# Patient Record
Sex: MALE | Race: ASIAN
[De-identification: names, ages, dates, MRNs, and addresses within clinical notes are randomized per-mention and may not be internally consistent; named-entity substitution may affect disease eponyms.]

---

## 2017-12-06 ENCOUNTER — APPOINTMENT (OUTPATIENT)
Dept: POPULATION HEALTH | Facility: CLINIC | Age: 22
End: 2017-12-06
Payer: COMMERCIAL

## 2017-12-06 PROCEDURE — 90746 HEPB VACCINE 3 DOSE ADULT IM: CPT

## 2017-12-06 PROCEDURE — 99212 OFFICE O/P EST SF 10 MIN: CPT | Mod: 25

## 2017-12-06 PROCEDURE — G0010: CPT

## 2017-12-06 PROCEDURE — 36415 COLL VENOUS BLD VENIPUNCTURE: CPT

## 2017-12-13 ENCOUNTER — APPOINTMENT (OUTPATIENT)
Dept: POPULATION HEALTH | Facility: CLINIC | Age: 22
End: 2017-12-13
Payer: COMMERCIAL

## 2017-12-13 PROCEDURE — 90746 HEPB VACCINE 3 DOSE ADULT IM: CPT

## 2017-12-13 PROCEDURE — G0010: CPT

## 2018-01-18 ENCOUNTER — APPOINTMENT (OUTPATIENT)
Dept: POPULATION HEALTH | Facility: CLINIC | Age: 23
End: 2018-01-18

## 2018-01-22 ENCOUNTER — APPOINTMENT (OUTPATIENT)
Dept: POPULATION HEALTH | Facility: CLINIC | Age: 23
End: 2018-01-22
Payer: COMMERCIAL

## 2018-01-22 PROCEDURE — 90746 HEPB VACCINE 3 DOSE ADULT IM: CPT

## 2018-01-22 PROCEDURE — G0010: CPT

## 2018-06-26 ENCOUNTER — APPOINTMENT (OUTPATIENT)
Dept: POPULATION HEALTH | Facility: CLINIC | Age: 23
End: 2018-06-26
Payer: COMMERCIAL

## 2018-06-26 PROCEDURE — G0010: CPT

## 2018-06-26 PROCEDURE — 90746 HEPB VACCINE 3 DOSE ADULT IM: CPT

## 2019-02-10 ENCOUNTER — EMERGENCY (EMERGENCY)
Facility: HOSPITAL | Age: 24
LOS: 0 days | Discharge: HOME | End: 2019-02-10
Attending: EMERGENCY MEDICINE
Payer: COMMERCIAL

## 2019-02-10 VITALS
RESPIRATION RATE: 18 BRPM | TEMPERATURE: 98 F | OXYGEN SATURATION: 99 % | HEIGHT: 69 IN | WEIGHT: 199.96 LBS | SYSTOLIC BLOOD PRESSURE: 156 MMHG | HEART RATE: 92 BPM | DIASTOLIC BLOOD PRESSURE: 96 MMHG

## 2019-02-10 DIAGNOSIS — M10.9 GOUT, UNSPECIFIED: ICD-10-CM

## 2019-02-10 DIAGNOSIS — M25.571 PAIN IN RIGHT ANKLE AND JOINTS OF RIGHT FOOT: ICD-10-CM

## 2019-02-10 PROCEDURE — 73610 X-RAY EXAM OF ANKLE: CPT | Mod: 26,RT

## 2019-02-10 PROCEDURE — 99283 EMERGENCY DEPT VISIT LOW MDM: CPT | Mod: 25

## 2019-02-10 RX ORDER — DEXTROAMPHETAMINE SACCHARATE, AMPHETAMINE ASPARTATE, DEXTROAMPHETAMINE SULFATE AND AMPHETAMINE SULFATE 1.875; 1.875; 1.875; 1.875 MG/1; MG/1; MG/1; MG/1
1 TABLET ORAL
Qty: 0 | Refills: 0 | COMMUNITY

## 2019-02-10 RX ORDER — OXYCODONE AND ACETAMINOPHEN 5; 325 MG/1; MG/1
1 TABLET ORAL ONCE
Qty: 0 | Refills: 0 | Status: DISCONTINUED | OUTPATIENT
Start: 2019-02-10 | End: 2019-02-10

## 2019-02-10 RX ORDER — IBUPROFEN 200 MG
600 TABLET ORAL ONCE
Qty: 0 | Refills: 0 | Status: COMPLETED | OUTPATIENT
Start: 2019-02-10 | End: 2019-02-10

## 2019-02-10 RX ORDER — ACETAMINOPHEN 500 MG
975 TABLET ORAL ONCE
Qty: 0 | Refills: 0 | Status: DISCONTINUED | OUTPATIENT
Start: 2019-02-10 | End: 2019-02-10

## 2019-02-10 RX ADMIN — OXYCODONE AND ACETAMINOPHEN 1 TABLET(S): 5; 325 TABLET ORAL at 09:33

## 2019-02-10 RX ADMIN — Medication 600 MILLIGRAM(S): at 09:33

## 2019-02-10 NOTE — ED PROVIDER NOTE - PHYSICAL EXAMINATION
Vitals: HTN at 156/96, otherwise WNL  Gen: AAOx3, NAD, sitting uncomfortably in stretcher  Head: ncat, perrla, eomi b/l  Neck: supple, no lymphadenopathy, no midline deviation  Heart: rrr, no m/r/g  Lungs: CTA b/l, no rales/ronchi/wheezes  Abd: soft, nontender, non-distended, no rebound or guarding  Ext: no clubbing/cyanosis/edema, mild localized swelling of R ankle and achilles area, tender to palpation over achilles tendon at insertion  Neuro: sensation and muscle strength intact b/l, steady gait Vitals: HTN at 156/96, otherwise WNL  Gen: AAOx3, NAD, sitting uncomfortably in stretcher  Head: ncat, perrla, eomi b/l  Neck: supple, no lymphadenopathy, no midline deviation  Heart: rrr, no m/r/g  Lungs: CTA b/l, no rales/ronchi/wheezes  Abd: soft, nontender, non-distended, no rebound or guarding  Ext: no clubbing/cyanosis/edema, mild localized swelling of R ankle and achilles area, tender to palpation over achilles tendon at insertion, gilliland test negative  Neuro: sensation and muscle strength intact b/l, steady gait

## 2019-02-10 NOTE — ED PROVIDER NOTE - PROGRESS NOTE DETAILS
Results reported to patient--grossly benign, XR shows no acute fracture  Pt. reports feeling better after meds  pt. agrees to f/u with primary care outpt., will refer to ortho; likely achilles strain  pt. understands to return to ED if symptoms worsen; will d/c

## 2019-02-10 NOTE — ED ADULT NURSE NOTE - OBJECTIVE STATEMENT
Pt was at work and noticed he had ankle pain on the right side, pt states that it was intermittent at first, but then he went to work and noticed it became more constant. Pt is not in any distress, but states the ankle pain is more consistent now. Pt has been icing it and keeping elevated.

## 2019-02-10 NOTE — ED PROVIDER NOTE - OBJECTIVE STATEMENT
22 yo M with R ankle pain, posterior at achilles tendon.  pt. 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  He denies acute inciting event.  notes that new shoes he was wearing are angled strangely and might have affected his gait.  No other complaints or inciting event.  Pt. feels well otherwise.   ROS: negative for fever, cough, headache, chest pain, shortness of breath, abd pain, nausea, vomiting, diarrhea, rash, paresthesia, and weakness--all other systems reviewed are negative.   PMH: asthma, gout; Meds: See EMR; SH: Denies smoking/drinking/drug use

## 2019-02-10 NOTE — ED PROVIDER NOTE - CARE PROVIDER_API CALL
Chi Allred (DO)  Orthopaedic Surgery  [address] [place] [address]  Phone: [phone]  Fax: [phone]  Follow Up Time: 4-6 Days

## 2019-02-10 NOTE — ED ADULT NURSE REASSESSMENT NOTE - NS ED NURSE REASSESS COMMENT FT1
Pt educated about follow up care and discharge instructions, pt ont in any distress or discomfort. Pt educated about crutch teaching, demonstration of crutch walking proves pt is proficient in ambulation. Ptis in some discomfort, no disitress noted at this time. Pt able to ambulate safely and steadily w/out assistance, denies dizziness/weakness upon standing,pt d/c'd home w/ family. Pt education deemed successful at time of discharge after patient education and teach back proves proficiency. Pt has no distress at time of discharge, pt provided discharge instructions, follow up care and results reviewed by MD. Reinforced by the RN at time of discharge.

## 2019-04-12 ENCOUNTER — APPOINTMENT (OUTPATIENT)
Dept: RHEUMATOLOGY | Facility: CLINIC | Age: 24
End: 2019-04-12

## 2019-06-30 ENCOUNTER — EMERGENCY (EMERGENCY)
Facility: HOSPITAL | Age: 24
LOS: 1 days | Discharge: HOME | End: 2019-06-30
Attending: EMERGENCY MEDICINE | Admitting: EMERGENCY MEDICINE
Payer: COMMERCIAL

## 2019-06-30 VITALS
RESPIRATION RATE: 18 BRPM | TEMPERATURE: 98 F | SYSTOLIC BLOOD PRESSURE: 138 MMHG | OXYGEN SATURATION: 100 % | DIASTOLIC BLOOD PRESSURE: 91 MMHG | HEART RATE: 61 BPM

## 2019-06-30 VITALS
DIASTOLIC BLOOD PRESSURE: 100 MMHG | HEART RATE: 59 BPM | TEMPERATURE: 99 F | OXYGEN SATURATION: 99 % | SYSTOLIC BLOOD PRESSURE: 149 MMHG | RESPIRATION RATE: 16 BRPM

## 2019-06-30 DIAGNOSIS — F33.1 MAJOR DEPRESSIVE DISORDER, RECURRENT, MODERATE: ICD-10-CM

## 2019-06-30 PROCEDURE — 99053 MED SERV 10PM-8AM 24 HR FAC: CPT

## 2019-06-30 PROCEDURE — 90792 PSYCH DIAG EVAL W/MED SRVCS: CPT

## 2019-06-30 PROCEDURE — 99283 EMERGENCY DEPT VISIT LOW MDM: CPT

## 2019-06-30 NOTE — ED BEHAVIORAL HEALTH ASSESSMENT NOTE - RISK ASSESSMENT
risk factors: mood episode recent thoughts of  hurting himself; substance abuse  protective factors: future oriented, no active or passive SI at present time No HX of SA, employed, cares about his cats; good family support In treatment, compliant

## 2019-06-30 NOTE — ED PROVIDER NOTE - OBJECTIVE STATEMENT
24 y/o m with pmhx of anxiety, ptsd, depression p/w worsening depression.  was found out gf of 6 years was cheating on him .states feels has no hope to live. family states pt sent note to friends and states he will kill himself on july 19th on the anniversary of his relationship. no hx of suicide attempt in the past. no drug use, no etoh. pt states took time off school for work but otherwise has no focal complaints. did not  take any meds prior to ed eval .

## 2019-06-30 NOTE — ED ADULT TRIAGE NOTE - NS ED NURSE BANDS TYPE
Name band; Notification Instructions: Patient will be notified of biopsy results. However, patient instructed to call the office if not contacted within 2 weeks.

## 2019-06-30 NOTE — ED BEHAVIORAL HEALTH ASSESSMENT NOTE - SUMMARY
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    Patient reports that he has been feeling very sad since yesterday after he found out that his girlfriend has been cheating on him for " a while"; my friend told me yesterday and I confronted her, she admitted to cheating on me" "I don't know how I can trust anyone; I was devastated" He reports that he has been feeling depressed, could not sleep and was feeling helpless;  he also admits that he had a " thought of ending it" but "it passed by;" no plan or intend "never" as per the patient . Patient denies any suicidality at present time He states that he has a lot to live for; he has 2 cats whom he adores and he loves his family and he will never do anything to hurt them; he also states that he likes his job and thinking of going back to college. He likes writing music.  Patient states that he is hoping to get back together again because it happened in the past. He states that he wanted to go to the beach after he found out about the cheating to rest and distract himself but his mother asked him to come to ER He denies hearing voices, no visions or delusions He denies any manic symptoms No goal directed activity, no diminished need for sleep HE has been compliant with meds and appointments.   Collateral information was obtained from patient's mother who reported that 2 days ago she received a call from his son's girlfriend who stated that he said that he was going to kill himself Mother drove to her place and got her son "in couple of hours" AS per the girlfriend he told her that he is going to kill himself on July 19th (on their 6th anniversary   patient's mother called his psychiatrist  and he advices them to bring to ER . patient's mother reports that she is concerned about him but she does not think that he is suicidal at present time. She states that patient has "rough" relationship with his girlfriend and "this is nothing new" she "did not hear that he is suicidal from him" She states that he still wants to go on vacation in a week with his friends and that he is on vacation now because he wants to get ready.   Patient reports that he wants to go [place] with his friends although he feels somewhat humiliated. He sates that they are a good support for him. He is planning to see his therapist "2 times this week" And then he will see his psychiatrist before he goes to [place]  Patient does not want to be hospitalized, mother states that she will stay with him for today and will drive him to therapy ton Monday She will also schedule an appointment with his psych Dr Cano

## 2019-06-30 NOTE — ED PROVIDER NOTE - CLINICAL SUMMARY MEDICAL DECISION MAKING FREE TEXT BOX
pt p/w acute onchronic depression with worsening depression. hd stable, pt pending psych eval for dispo as per psych .

## 2019-06-30 NOTE — ED PROVIDER NOTE - PROGRESS NOTE DETAILS
sonja: psych cleared pt for d/c home. will f/u with psych outpt this upcoming week. pt agrees. family agrees.

## 2019-06-30 NOTE — ED BEHAVIORAL HEALTH ASSESSMENT NOTE - OTHER
mother called the psychiatrist who recommended to bring patient to ER has cats; cares about them, and states that he will never hurt his family by having SA tearful,- constricted tearful
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
## 2019-06-30 NOTE — ED ADULT NURSE NOTE - OBJECTIVE STATEMENT
Pt received to [facility]. Pt presents withdrawn, poor eye contact, with delayed responses. Pt states "someone told him some things" which prompted him to come to the ER. Per EMS, pt cited a recent break up with girlfriend and also per EMS pt was sending suicidal text messages to friends which pt denies. Pt denies SI/HI. Pts belongings secured for safety. Pt awaiting psychiatric evaluation.

## 2019-06-30 NOTE — ED PROVIDER NOTE - PSYCHIATRIC, MLM
Alert and oriented to person, place, time/situation. depressed mood and affect. +apparent risk to self. no risk to others others.

## 2019-06-30 NOTE — ED BEHAVIORAL HEALTH ASSESSMENT NOTE - SUICIDE PROTECTIVE FACTORS
Responsibility to family and others/Identifies reasons for living/Future oriented/High spirituality/Engaged in work or school/Positive therapeutic relationships/High frustration tolerance/Other
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
## 2019-06-30 NOTE — ED PROVIDER NOTE - PHYSICAL EXAMINATION
pt whispering not fully answering questions.   family used( due to risk to patient) for collatoral information.

## 2019-06-30 NOTE — ED BEHAVIORAL HEALTH ASSESSMENT NOTE - DESCRIPTION
cooperative; pleasant   Vital Signs Last 24 Hrs  T(C): 36.7 (30 Jun 2019 07:32), Max: 37.2 (30 Jun 2019 02:20)  T(F): 98.1 (30 Jun 2019 07:32), Max: 98.9 (30 Jun 2019 02:20)  HR: 61 (30 Jun 2019 07:32) (59 - 61)  BP: 138/91 (30 Jun 2019 07:32) (138/91 - 149/100)  BP(mean): --  RR: 18 (30 Jun 2019 07:32) (16 - 18)  SpO2: 100% (30 Jun 2019 07:32) (99% - 100%) denies sony in relationship for 6 years off and on, childless  domiciled, resides with his parents
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
## 2022-04-27 VITALS — BODY MASS INDEX: 32.58 KG/M2 | WEIGHT: 220 LBS | HEIGHT: 69 IN

## 2022-04-28 ENCOUNTER — APPOINTMENT (OUTPATIENT)
Dept: ORTHOPEDIC SURGERY | Facility: CLINIC | Age: 27
End: 2022-04-28

## 2023-04-28 NOTE — ED BEHAVIORAL HEALTH ASSESSMENT NOTE - HPI (INCLUDE ILLNESS QUALITY, SEVERITY, DURATION, TIMING, CONTEXT, MODIFYING FACTORS, ASSOCIATED SIGNS AND SYMPTOMS)
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    Patient reports that he has been feeling very sad since yesterday after he found out that his girlfriend has been cheating on him for " a while"; my friend told me yesterday and I confronted her, she admitted to cheating on me" "I don't know how I can trust anyone; I was devastated" He reports that he has been feeling depressed, could not sleep and was feeling helpless;  he also admits that he had a " thought of ending it" but "it passed by;" no plan or intend "never" as per the patient . Patient denies any suicidality at present time He states that he has a lot to live for; he has 2 cats whom he adores and he loves his family and he will never do anything to hurt them; he also states that he likes his job and thinking of going back to college. He likes writing music.  Patient states that he is hoping to get back together again because it happened in the past. He states that he wanted to go to the beach after he found out about the cheating to rest and distract himself but his mother asked him to come to ER He denies hearing voices, no visions or delusions He denies any manic symptoms No goal directed activity, no diminished need for sleep HE has been compliant with meds and appointments.   Collateral information was obtained from patient's mother who reported that 2 days ago she received a call from his son's girlfriend who stated that he said that he was going to kill himself Mother drove to her place and got her son "in couple of hours" AS per the girlfriend he told her that he is going to kill himself on July 19th (on their 6th anniversary   patient's mother called his psychiatrist  and he advices them to bring to ER . patient's mother reports that she is concerned about him but she does not think that he is suicidal at present time. She states that patient has "rough" relationship with his girlfriend and "this is nothing new" she "did not hear that he is suicidal from him" She states that he still wants to go on vacation in a week with his friends and that he is on vacation now because he wants to get ready.   Patient reports that he wants to go [place] with his friends although he feels somewhat humiliated. He sates that they are a good support for him. He is planning to see his therapist "2 times this week" And then he will see his psychiatrist before he goes to [place] Yes

## 2023-10-10 ENCOUNTER — EMERGENCY (EMERGENCY)
Facility: HOSPITAL | Age: 28
LOS: 1 days | Discharge: HOME | End: 2023-10-10
Attending: STUDENT IN AN ORGANIZED HEALTH CARE EDUCATION/TRAINING PROGRAM
Payer: SELF-PAY

## 2023-10-10 VITALS
TEMPERATURE: 98 F | RESPIRATION RATE: 20 BRPM | WEIGHT: 198.42 LBS | SYSTOLIC BLOOD PRESSURE: 153 MMHG | DIASTOLIC BLOOD PRESSURE: 87 MMHG | HEART RATE: 91 BPM | OXYGEN SATURATION: 97 %

## 2023-10-10 LAB
ALBUMIN SERPL ELPH-MCNC: 3.9 G/DL — SIGNIFICANT CHANGE UP (ref 3.5–5)
ALP SERPL-CCNC: 76 U/L — SIGNIFICANT CHANGE UP (ref 40–120)
ALT FLD-CCNC: 56 U/L DA — SIGNIFICANT CHANGE UP (ref 10–60)
AMPHET UR-MCNC: NEGATIVE — SIGNIFICANT CHANGE UP
ANION GAP SERPL CALC-SCNC: 7 MMOL/L — SIGNIFICANT CHANGE UP (ref 5–17)
APAP SERPL-MCNC: <2 UG/ML — LOW (ref 10–30)
APPEARANCE UR: CLEAR — SIGNIFICANT CHANGE UP
AST SERPL-CCNC: 26 U/L — SIGNIFICANT CHANGE UP (ref 10–40)
BACTERIA # UR AUTO: ABNORMAL /HPF
BACTERIA # UR AUTO: ABNORMAL /HPF
BARBITURATES UR SCN-MCNC: NEGATIVE — SIGNIFICANT CHANGE UP
BASOPHILS # BLD AUTO: 0.1 K/UL — SIGNIFICANT CHANGE UP (ref 0–0.2)
BASOPHILS NFR BLD AUTO: 1 % — SIGNIFICANT CHANGE UP (ref 0–2)
BENZODIAZ UR-MCNC: NEGATIVE — SIGNIFICANT CHANGE UP
BILIRUB SERPL-MCNC: 0.2 MG/DL — SIGNIFICANT CHANGE UP (ref 0.2–1.2)
BILIRUB UR-MCNC: NEGATIVE — SIGNIFICANT CHANGE UP
BUN SERPL-MCNC: 17 MG/DL — SIGNIFICANT CHANGE UP (ref 7–18)
CALCIUM SERPL-MCNC: 8.5 MG/DL — SIGNIFICANT CHANGE UP (ref 8.4–10.5)
CHLORIDE SERPL-SCNC: 103 MMOL/L — SIGNIFICANT CHANGE UP (ref 96–108)
CO2 SERPL-SCNC: 27 MMOL/L — SIGNIFICANT CHANGE UP (ref 22–31)
COCAINE METAB.OTHER UR-MCNC: NEGATIVE — SIGNIFICANT CHANGE UP
COLOR SPEC: YELLOW — SIGNIFICANT CHANGE UP
CREAT SERPL-MCNC: 1.06 MG/DL — SIGNIFICANT CHANGE UP (ref 0.5–1.3)
DIFF PNL FLD: NEGATIVE — SIGNIFICANT CHANGE UP
EGFR: 99 ML/MIN/1.73M2 — SIGNIFICANT CHANGE UP
EOSINOPHIL # BLD AUTO: 0.3 K/UL — SIGNIFICANT CHANGE UP (ref 0–0.5)
EOSINOPHIL NFR BLD AUTO: 3.1 % — SIGNIFICANT CHANGE UP (ref 0–6)
ETHANOL SERPL-MCNC: 4 MG/DL — SIGNIFICANT CHANGE UP (ref 0–10)
GLUCOSE SERPL-MCNC: 94 MG/DL — SIGNIFICANT CHANGE UP (ref 70–99)
GLUCOSE UR QL: NEGATIVE MG/DL — SIGNIFICANT CHANGE UP
HCT VFR BLD CALC: 40.1 % — SIGNIFICANT CHANGE UP (ref 39–50)
HGB BLD-MCNC: 13.2 G/DL — SIGNIFICANT CHANGE UP (ref 13–17)
IMM GRANULOCYTES NFR BLD AUTO: 0.8 % — SIGNIFICANT CHANGE UP (ref 0–0.9)
KETONES UR-MCNC: NEGATIVE MG/DL — SIGNIFICANT CHANGE UP
LEUKOCYTE ESTERASE UR-ACNC: NEGATIVE — SIGNIFICANT CHANGE UP
LYMPHOCYTES # BLD AUTO: 3.32 K/UL — HIGH (ref 1–3.3)
LYMPHOCYTES # BLD AUTO: 33.9 % — SIGNIFICANT CHANGE UP (ref 13–44)
MCHC RBC-ENTMCNC: 28.9 PG — SIGNIFICANT CHANGE UP (ref 27–34)
MCHC RBC-ENTMCNC: 32.9 GM/DL — SIGNIFICANT CHANGE UP (ref 32–36)
MCV RBC AUTO: 87.7 FL — SIGNIFICANT CHANGE UP (ref 80–100)
METHADONE UR-MCNC: NEGATIVE — SIGNIFICANT CHANGE UP
MONOCYTES # BLD AUTO: 1.02 K/UL — HIGH (ref 0–0.9)
MONOCYTES NFR BLD AUTO: 10.4 % — SIGNIFICANT CHANGE UP (ref 2–14)
NEUTROPHILS # BLD AUTO: 4.96 K/UL — SIGNIFICANT CHANGE UP (ref 1.8–7.4)
NEUTROPHILS NFR BLD AUTO: 50.8 % — SIGNIFICANT CHANGE UP (ref 43–77)
NITRITE UR-MCNC: NEGATIVE — SIGNIFICANT CHANGE UP
NRBC # BLD: 0 /100 WBCS — SIGNIFICANT CHANGE UP (ref 0–0)
OPIATES UR-MCNC: NEGATIVE — SIGNIFICANT CHANGE UP
PCP SPEC-MCNC: SIGNIFICANT CHANGE UP
PCP UR-MCNC: NEGATIVE — SIGNIFICANT CHANGE UP
PH UR: 6.5 — SIGNIFICANT CHANGE UP (ref 5–8)
PLATELET # BLD AUTO: 283 K/UL — SIGNIFICANT CHANGE UP (ref 150–400)
POTASSIUM SERPL-MCNC: 3.7 MMOL/L — SIGNIFICANT CHANGE UP (ref 3.5–5.3)
POTASSIUM SERPL-SCNC: 3.7 MMOL/L — SIGNIFICANT CHANGE UP (ref 3.5–5.3)
PROT SERPL-MCNC: 7.5 G/DL — SIGNIFICANT CHANGE UP (ref 6–8.3)
PROT UR-MCNC: NEGATIVE MG/DL — SIGNIFICANT CHANGE UP
RBC # BLD: 4.57 M/UL — SIGNIFICANT CHANGE UP (ref 4.2–5.8)
RBC # FLD: 14.3 % — SIGNIFICANT CHANGE UP (ref 10.3–14.5)
RBC CASTS # UR COMP ASSIST: 3 /HPF — SIGNIFICANT CHANGE UP (ref 0–4)
RBC CASTS # UR COMP ASSIST: 3 /HPF — SIGNIFICANT CHANGE UP (ref 0–4)
SALICYLATES SERPL-MCNC: <1.7 MG/DL — LOW (ref 2.8–20)
SODIUM SERPL-SCNC: 137 MMOL/L — SIGNIFICANT CHANGE UP (ref 135–145)
SP GR SPEC: 1.02 — SIGNIFICANT CHANGE UP (ref 1–1.03)
THC UR QL: NEGATIVE — SIGNIFICANT CHANGE UP
TROPONIN I, HIGH SENSITIVITY RESULT: 15.2 NG/L — SIGNIFICANT CHANGE UP
UROBILINOGEN FLD QL: 0.2 MG/DL — SIGNIFICANT CHANGE UP (ref 0.2–1)
WBC # BLD: 9.78 K/UL — SIGNIFICANT CHANGE UP (ref 3.8–10.5)
WBC # FLD AUTO: 9.78 K/UL — SIGNIFICANT CHANGE UP (ref 3.8–10.5)
WBC UR QL: 3 /HPF — SIGNIFICANT CHANGE UP (ref 0–5)
WBC UR QL: 3 /HPF — SIGNIFICANT CHANGE UP (ref 0–5)

## 2023-10-10 PROCEDURE — 99285 EMERGENCY DEPT VISIT HI MDM: CPT

## 2023-10-10 PROCEDURE — 70450 CT HEAD/BRAIN W/O DYE: CPT | Mod: MA

## 2023-10-10 PROCEDURE — 81001 URINALYSIS AUTO W/SCOPE: CPT

## 2023-10-10 PROCEDURE — 80307 DRUG TEST PRSMV CHEM ANLYZR: CPT

## 2023-10-10 PROCEDURE — 71045 X-RAY EXAM CHEST 1 VIEW: CPT | Mod: 26

## 2023-10-10 PROCEDURE — 99285 EMERGENCY DEPT VISIT HI MDM: CPT | Mod: 25

## 2023-10-10 PROCEDURE — 85025 COMPLETE CBC W/AUTO DIFF WBC: CPT

## 2023-10-10 PROCEDURE — 70450 CT HEAD/BRAIN W/O DYE: CPT | Mod: 26,MA

## 2023-10-10 PROCEDURE — 71045 X-RAY EXAM CHEST 1 VIEW: CPT

## 2023-10-10 PROCEDURE — 84484 ASSAY OF TROPONIN QUANT: CPT

## 2023-10-10 PROCEDURE — 82962 GLUCOSE BLOOD TEST: CPT

## 2023-10-10 PROCEDURE — 93005 ELECTROCARDIOGRAM TRACING: CPT

## 2023-10-10 PROCEDURE — 80053 COMPREHEN METABOLIC PANEL: CPT

## 2023-10-10 PROCEDURE — 36415 COLL VENOUS BLD VENIPUNCTURE: CPT

## 2023-10-10 RX ORDER — SODIUM CHLORIDE 9 MG/ML
1000 INJECTION, SOLUTION INTRAVENOUS ONCE
Refills: 0 | Status: COMPLETED | OUTPATIENT
Start: 2023-10-10 | End: 2023-10-10

## 2023-10-10 RX ADMIN — SODIUM CHLORIDE 1000 MILLILITER(S): 9 INJECTION, SOLUTION INTRAVENOUS at 19:23
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
## 2023-10-10 NOTE — ED ADULT NURSE NOTE - NSFALLUNIVINTERV_ED_ALL_ED
0
Bed/Stretcher in lowest position, wheels locked, appropriate side rails in place/Call bell, personal items and telephone in reach/Instruct patient to call for assistance before getting out of bed/chair/stretcher/Non-slip footwear applied when patient is off stretcher/[place] to call system/Physically safe environment - no spills, clutter or unnecessary equipment/Purposeful proactive rounding/Room/bathroom lighting operational, light cord in reach
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
## 2023-10-10 NOTE — ED ADULT NURSE NOTE - CHPI ED NUR SYMPTOMS POS
Pt. denies any symptoms at this time; asymptomatic upon presentation; reported symptoms have subsided upon arrival in the ED/DIZZINESS/NAUSEA/PAIN

## 2023-10-10 NOTE — ED PROVIDER NOTE - PHYSICAL EXAMINATION
CONSTITUTIONAL: non-toxic, well appearing  SKIN: no rash, no petechiae.  EYES: PERRL, EOMI, pink conjunctiva, anicteric  ENT: tongue and uvular midline, no exudates, moist mucous membranes  NECK: Supple; no meningismus, no JVD  CARD: RRR, no murmurs, equal radial pulses bilaterally 2+  RESP: CTAB, no respiratory distress  ABD: Soft, non-tender, non-distended, no peritoneal signs, no CVA tenderness  EXT: Normal ROM x4. No edema. No calf tenderness  NEURO: Alert, oriented. Neuro exam nonfocal  PSYCH: Bizarre affect

## 2023-10-10 NOTE — ED PROVIDER NOTE - NSFOLLOWUPINSTRUCTIONS_ED_ALL_ED_FT
Confusion is the inability to think with the usual speed or clarity. People who are confused often describe their thinking as cloudy or unclear. Confusion can also include feeling disoriented. This means you are unaware of where you are or who you are. You may also not know the date or time. When confused, you may have difficulty remembering, paying attention, or making decisions. Some people also act aggressively when they are confused.    In some cases, confusion may come on quickly. In other cases, it may develop slowly over time. How quickly confusion comes on depends on the cause.    Confusion may be caused by:    Head injury (concussion).  Seizures.  Stroke.  Fever.  Brain tumor.  Decrease in brain function due to a vascular or neurologic condition (dementia).  Emotions, like rage or terror.  Inability to know what is real and what is not (hallucinations).  Infections, such as a urinary tract infection (UTI).  Using too much alcohol, drugs, or medicines.  Loss of fluid (dehydration) or an imbalance of salts in the body (electrolytes).  Lack of sleep.  Low blood sugar (diabetes).  Low levels of oxygen. This comes from conditions such as chronic lung disorders.  Side effects of medicines, or taking medicines that affect other medicines (drug interactions).  Lack of certain nutrients, especially niacin, thiamine, vitamin C, or vitamin B.  Sudden drop in body temperature (hypothermia).  Change in routine, such as traveling or being hospitalized.    Follow these instructions at home:  Pay attention to your symptoms. Tell your health care provider about any changes or if you develop new symptoms. Follow these instructions to control or treat symptoms. Ask a family member or friend for help if needed.        Medicines    Take over-the-counter and prescription medicines only as told by your health care provider.  Ask your health care provider about changing or stopping any medicines that may be causing your confusion.  Avoid pain medicines or sleep medicines until you have fully recovered.  Use a pillbox or an alarm to help you take the right medicines at the right time.        Lifestyle     Eat a balanced diet that includes fruits and vegetables.  Get enough sleep. For most adults, this is 7–9 hours each night.  Do not drink alcohol.  Do not become isolated. Spend time with other people and make plans for your days.  Do not drive until your health care provider says that it is safe to do so.  Do not use any products that contain nicotine or tobacco, such as cigarettes and e-cigarettes. If you need help quitting, ask your health care provider.  Stop other activities that may increase your chances of getting hurt. These may include some work duties, sports activities, swimming, or bike riding. Ask your health care provider what activities are safe for you.        What caregivers can do    Find out if the person is confused. Ask the person to state his or her name, age, and the date. If the person is unsure or answers incorrectly, he or she may be confused.  Always introduce yourself, no matter how well the person knows you.  Remind the person of his or her location. Do this often.  Place a calendar and clock near the person who is confused.  Talk about current events and plans for the day.  Keep the environment calm, quiet, and peaceful.  Help the person do the things that he or she is unable to do. These include:    Taking medicines.   Keeping follow-up visits with his or her health care provider.  Helping with household duties, including meal preparation.  Running errands.  Get help if you need it. There are several support groups for caregivers.  If the person you are helping needs more support, consider day care, extended care programs, or a skilled nursing facility. The person's health care provider may be able to help evaluate these options.        General instructions    Monitor yourself for any conditions you may have. These may include:    Checking your blood glucose levels, if you have diabetes.  Watching your weight, if you are overweight.  Monitoring your blood pressure, if you have hypertension.  Monitoring your body temperature, if you have a fever.  Keep all follow-up visits as told by your health care provider. This is important.    Contact a health care provider if:  Your symptoms get worse.    Get help right away if you:  Feel that you are not able to care for yourself.  Develop severe headaches, repeated vomiting, seizures, blackouts, or slurred speech.  Have increasing confusion, weakness, numbness, restlessness, or personality changes.  Develop a loss of balance, have marked dizziness, feel uncoordinated, or fall.  Develop severe anxiety, or you have delusions or hallucinations.    These symptoms may represent a serious problem that is an emergency. Do not wait to see if the symptoms will go away. Get medical help right away. Call your local emergency services (911 in the [place] Do not drive yourself to the hospital.    Summary  Confusion is the inability to think with the usual speed or clarity. People who are confused often describe their thinking as cloudy or unclear.  Confusion can also include having difficulty remembering, paying attention, or making decisions.  Confusion may come on quickly or develop slowly over time, depending on the cause. There are many different causes of confusion.  Ask for help from family members or friends if you are unable to take care of yourself.

## 2023-10-10 NOTE — ED PROVIDER NOTE - CLINICAL SUMMARY MEDICAL DECISION MAKING FREE TEXT BOX
Kim: 27-year-old male with past medical history anxiety on Xanax and risperidone, hypertension presents with episode of altered mental status.  Patient with mother, states patient's was at work, states he drank  unsealed water bottle on his drive to work, reports dizziness, "not feeling right," chest tightness, and confusion.  Patient reports improved symptoms at this time.  Mother states patient currently at baseline mental status.  Denies any headache, vision change, vomiting, diarrhea, bloody stools, dysuria, numbness, focal weakness, or rash. Physical exam per above. No evidence of a cardiac arrythmia such as Brugada, WPW, HOCM, long or short QT.  Neurologic exam is nonfocal, not c/w CVA or primary neurologic abnormality. Will obtain labs, imaging, supportive treatment/observation with dispo pending workup.

## 2023-10-10 NOTE — ED PROVIDER NOTE - PROGRESS NOTE DETAILS
Germain-: pt seen and re-evaluated at bedside.  Pt states their symptoms have improved.  Pt comfortable in NAD.  Labs/imaging non-actionable. Patient at baseline mental status, no complaints at this time. Will DC with PMD follow up/return precautions, pt understood and agreeable with plan.

## 2023-10-10 NOTE — ED PROVIDER NOTE - OBJECTIVE STATEMENT
27-year-old male with past medical history anxiety on Xanax and risperidone, hypertension presents with episode of altered mental status.  Patient with mother, states patient's was at work, states he drank  unsealed water bottle on his drive to work, reports dizziness, "not feeling right," chest tightness, and confusion.  Patient reports improved symptoms at this time.  Mother states patient currently at baseline mental status.  Denies any headache, vision change, vomiting, diarrhea, bloody stools, dysuria, numbness, focal weakness, or rash.  Denies any additional complaints.

## 2023-10-10 NOTE — ED PROVIDER NOTE - PATIENT PORTAL LINK FT
You can access the FollowMyHealth Patient Portal offered by [facility] by registering at the following website: [email or web address]. By joining [facility]’s FollowMyHealth portal, you will also be able to view your health information using other applications (apps) compatible with our system.

## 2023-10-21 PROBLEM — F32.9 MAJOR DEPRESSIVE DISORDER, SINGLE EPISODE, UNSPECIFIED: Chronic | Status: ACTIVE | Noted: 2019-06-30
